# Patient Record
Sex: FEMALE | Race: WHITE | NOT HISPANIC OR LATINO | Employment: OTHER | URBAN - METROPOLITAN AREA
[De-identification: names, ages, dates, MRNs, and addresses within clinical notes are randomized per-mention and may not be internally consistent; named-entity substitution may affect disease eponyms.]

---

## 2019-06-05 ENCOUNTER — TRANSFERRED RECORDS (OUTPATIENT)
Dept: HEALTH INFORMATION MANAGEMENT | Facility: CLINIC | Age: 74
End: 2019-06-05

## 2021-09-16 ENCOUNTER — TRANSFERRED RECORDS (OUTPATIENT)
Dept: HEALTH INFORMATION MANAGEMENT | Facility: CLINIC | Age: 76
End: 2021-09-16

## 2021-11-15 ENCOUNTER — TRANSFERRED RECORDS (OUTPATIENT)
Dept: HEALTH INFORMATION MANAGEMENT | Facility: CLINIC | Age: 76
End: 2021-11-15

## 2021-12-10 ENCOUNTER — TRANSFERRED RECORDS (OUTPATIENT)
Dept: HEALTH INFORMATION MANAGEMENT | Facility: CLINIC | Age: 76
End: 2021-12-10

## 2022-01-10 ENCOUNTER — TRANSFERRED RECORDS (OUTPATIENT)
Dept: HEALTH INFORMATION MANAGEMENT | Facility: CLINIC | Age: 77
End: 2022-01-10
Payer: COMMERCIAL

## 2022-01-13 ENCOUNTER — TRANSCRIBE ORDERS (OUTPATIENT)
Dept: OTHER | Age: 77
End: 2022-01-13

## 2022-01-13 DIAGNOSIS — Z85.3 H/O MALIGNANT NEOPLASM OF BREAST: Primary | ICD-10-CM

## 2022-01-14 ENCOUNTER — PATIENT OUTREACH (OUTPATIENT)
Dept: ONCOLOGY | Facility: CLINIC | Age: 77
End: 2022-01-14
Payer: COMMERCIAL

## 2022-01-14 ENCOUNTER — DOCUMENTATION ONLY (OUTPATIENT)
Dept: ONCOLOGY | Facility: CLINIC | Age: 77
End: 2022-01-14

## 2022-01-14 NOTE — PROGRESS NOTES
New Patient Oncology Nurse Navigator Note     Referring provider: Dr. Jessica Jean MD      Referring Clinic/Organization: [facility]      Referred to (specialty): Medical Oncology     Requested provider (if applicable): Breast specialist at [facility]     Date Referral Received: January 14, 2022     Evaluation for:  Breast cancer     Clinical History (per Nurse review of records provided):      1984 - right breast cancer mastectomy and adjuvant chemo   Krystyna was diagnosed with right breast cancer in 1984 at age 39.   She was treated at [facility] in [place]. She has a right radical mastectomy on 9/17/84 showing IDC, ER/KY+, with mets to four of ten axillary lymph nodes.     Dr. Soren Milner was her medical oncologist and she received adjuvant chemotherapy.  The plan was for a six-month course of Cytoxan and Methotrexate for 8 cycles.      She does not recall taking any endocrine therapy.  No radiotherapy needed.     2012 -  She presented to the emergency room on 6/20/12 complaining of chest pain radiating to arm.  Acute coronary disease and pulmonary embolism were ruled out.  CT-Chest revealed multipl lung lesions.  PET on 6/26/2012 confirmed hypermetabolic lesion in rib.  CT-guided FNA of left rib on 7/3/12  was performed showing metastatic carcinoma (ER+, KY weak positive, HER2 negative), consistent with breast primary.     Please see Dr. Jean's note from 1/10/2022 for overview of treatment since 2012. Last note from [facility] provider, Dr. Jean, on 1/10/22.  Patient has been in his care since approximately 2013, and his colleague, Dr. Hector Díaz, prior to that.      She did have some care in [place] where she wintered.  This was at [facility] and she had injections monthly during her stays.      Genetic testing 6/5/2019 -- no clinically significant mutation identified.  [employer] BRACAnalysis was negative.      Records Location: Care Everywhere, Media and See  Bookmarked material     Records Needed:   Genetic counseling and testing from 2019.  Pathology review for specimens from 1984 and 2012 1984 breast cancer pathology report --[facility] in [place] per patient  1984 Surgery consult, op note, and progress notes  1984 Medical oncology consult note and progress notes  Imaging since 2012--Multiple PET scans sometimes quarterly images and reports needed please.    Records from [facility] at least since Jan 21, 2013    Telephoned and spoke with Krystyna to discuss her oncology history and needs at this time.  We will begin to assemble records and schedule her for second opinion with medical oncology breast cancer expert at .

## 2022-01-14 NOTE — PROGRESS NOTES
Action January 14, 2022 4:24 PM ABT   Action Taken Records from [facility] received and sent to HIM for upload

## 2022-01-17 ENCOUNTER — PRE VISIT (OUTPATIENT)
Dept: OTHER | Age: 77
End: 2022-01-17
Payer: COMMERCIAL

## 2022-01-17 NOTE — TELEPHONE ENCOUNTER
Action 1.17.22 8:40 AM LALIT   Action Taken Pulled in [place] in [place]. Faxed request to [place] [phone] and [facility] [phone] for all images     Faxed request [facility] for records [phone]     Action January 19, 2022 9:14 AM ABT   Action Taken Images from [facility] and [place] received and resolved to PACS. Fax sent to [facility] imaging team to resolve Mammos     Action January 26, 2022 9:03 AM ABT   Action Taken Records from [place] received and sent to HIM for upload

## 2022-01-20 NOTE — TELEPHONE ENCOUNTER
RECORDS STATUS - BREAST    RECORDS REQUESTED FROM: [facility] Vann   DATE REQUESTED:    NOTES DETAILS STATUS   OFFICE NOTE from referring provider Epic/NIVIA Vann/Edda  notes received - [facility]   OFFICE NOTE from medical oncologist NIVIA Wilde 1/10/22   Genetic Counseling NIVIA - Marilyn Peralta: 4/15/20   OFFICE NOTE from surgeon     OFFICE NOTE from radiation oncologist NIVIA Wilde 17   DISCHARGE SUMMARY from hospital     DISCHARGE REPORT from the ER     OPERATIVE REPORT     MEDICATION LIST     CLINICAL TRIAL TREATMENTS TO DATE     LABS     REQUEST BLOCKS FOR ALL BREAST CANCER PTS     PATHOLOGY REPORTS  (Tissue diagnosis, Stage, ER/VA percentage positive and intensity of staining, HER2 IHC, FISH, and all biopsies from breast and any distant metastasis)                 Requested     [facility]  7/3/12: [id number]      Path Report/ (Edwar) - Received  - [facility]   GENONOMIC TESTING     TYPE:   (Next Generation Sequencing, including Foundation One testing, and Oncotype score) Received 19: Indira  7/3/12: Liz Score   IMAGING (NEED IMAGES & REPORT)     CT SCANS     MRI PACS 19 - 12: [facility]   MAMMO     ULTRASOUND     PET PACS 19 - 12: [facility]   BONE SCAN     BRAIN MRI

## 2022-01-27 ENCOUNTER — LAB (OUTPATIENT)
Dept: LAB | Facility: CLINIC | Age: 77
End: 2022-01-27
Payer: MEDICARE

## 2022-01-27 DIAGNOSIS — C50.919 CARCINOMA OF BREAST METASTATIC TO BONE (H): Primary | ICD-10-CM

## 2022-01-27 DIAGNOSIS — C79.51 CARCINOMA OF BREAST METASTATIC TO BONE (H): Primary | ICD-10-CM

## 2022-02-01 ENCOUNTER — PATIENT OUTREACH (OUTPATIENT)
Dept: ONCOLOGY | Facility: CLINIC | Age: 77
End: 2022-02-01
Payer: COMMERCIAL

## 2022-02-01 ENCOUNTER — PRE VISIT (OUTPATIENT)
Dept: ONCOLOGY | Facility: CLINIC | Age: 77
End: 2022-02-01
Payer: COMMERCIAL

## 2022-02-01 ENCOUNTER — ONCOLOGY VISIT (OUTPATIENT)
Dept: ONCOLOGY | Facility: CLINIC | Age: 77
End: 2022-02-01
Payer: MEDICARE

## 2022-02-01 VITALS
HEART RATE: 92 BPM | BODY MASS INDEX: 31.84 KG/M2 | RESPIRATION RATE: 16 BRPM | SYSTOLIC BLOOD PRESSURE: 166 MMHG | OXYGEN SATURATION: 97 % | HEIGHT: 64 IN | WEIGHT: 186.5 LBS | DIASTOLIC BLOOD PRESSURE: 86 MMHG | TEMPERATURE: 98 F

## 2022-02-01 DIAGNOSIS — Z85.3 H/O MALIGNANT NEOPLASM OF BREAST: ICD-10-CM

## 2022-02-01 DIAGNOSIS — C50.911 BREAST CANCER METASTASIZED TO BONE, RIGHT (H): Primary | ICD-10-CM

## 2022-02-01 DIAGNOSIS — C79.51 BREAST CANCER METASTASIZED TO BONE, RIGHT (H): Primary | ICD-10-CM

## 2022-02-01 DIAGNOSIS — C79.51 BONE METASTASIS: ICD-10-CM

## 2022-02-01 PROCEDURE — G0463 HOSPITAL OUTPT CLINIC VISIT: HCPCS | Mod: 25

## 2022-02-01 PROCEDURE — 99205 OFFICE O/P NEW HI 60 MIN: CPT | Performed by: INTERNAL MEDICINE

## 2022-02-01 PROCEDURE — 99417 PROLNG OP E/M EACH 15 MIN: CPT | Performed by: INTERNAL MEDICINE

## 2022-02-01 PROCEDURE — G0463 HOSPITAL OUTPT CLINIC VISIT: HCPCS

## 2022-02-01 RX ORDER — CRANBERRY FRUIT EXTRACT 200 MG
1 CAPSULE ORAL
COMMUNITY

## 2022-02-01 RX ORDER — ACETAMINOPHEN 325 MG/1
650 TABLET ORAL
COMMUNITY

## 2022-02-01 RX ORDER — VIT A/VIT C/VIT E/ZINC/COPPER 4296-226
1 CAPSULE ORAL
COMMUNITY

## 2022-02-01 RX ORDER — ANASTROZOLE 1 MG/1
TABLET ORAL
COMMUNITY
Start: 2021-10-16

## 2022-02-01 RX ORDER — CHLORAL HYDRATE 500 MG
1000 CAPSULE ORAL
COMMUNITY

## 2022-02-01 RX ORDER — ASCORBIC ACID 500 MG
500 TABLET ORAL
COMMUNITY

## 2022-02-01 RX ORDER — SODIUM PHOSPHATE,MONO-DIBASIC 19G-7G/118
1 ENEMA (ML) RECTAL
COMMUNITY

## 2022-02-01 ASSESSMENT — MIFFLIN-ST. JEOR: SCORE: 1313.71

## 2022-02-01 ASSESSMENT — PAIN SCALES - GENERAL: PAINLEVEL: NO PAIN (0)

## 2022-02-01 NOTE — PROGRESS NOTES
Met with patient and her daughter to introduce self and provide contact information.   Sent email to daughters email to set up [facility].   Plan is for Dr Ferrera to find an Oncologist to recommend locally.   Dr Ferrera discussing options on radiation treatments.   Will follow-up with patient when information is available.   Encouraged her to call with any additional questions.

## 2022-02-01 NOTE — PROGRESS NOTES
Oncology Consultation:  Date on this visit: 2/1/2022    Krystyna Aldridge is referred by Dr. Jean from [facility] in [place], [place] for an oncology consultation. She requires evaluation for ER positive breast cancer metastasized to bone.    Primary Physician: Anibal Stock     History Of Present Illness:  Ms. Aldridge is a 76 year old female with recurrent right breast cancer metastasized to bone.  She was diagnosed with a right breast cancer in 1984.  This was treated with right breast mastectomy followed by adjuvant chemotherapy.  She recurred in 2012.      She presented in 07/2012 with chest pain radiating to the arm.  CT chest showed a left lateral 7th rib lesion, multiple small pulmonary nodules and a small focal area of pleural thickening at the left lung base.  PET/CT confirmed a hypermetabolic lesion in the rib and additional areas in the right axilla, right anterior chest wall, and left pedicle of T12.  Biopsy of the left rib was c/w recurrent metastatic breast cancer, ER positive (Liz 8/8), MI weak positive (Liz 3/8), and HER2 negative (1+ by IHC).  She was initiated on treatment with fulvestrant and Xgeva.  She remained on this treatment until 01/2017 at which time imaging showed progression of bone metastases (at T9, T10, and L1).  She was then on treatment with fulvestrant and anastrozole.  Ribociclib was added 05/2018 due to progression of bone metastases.  07/2018 ribociclib was changed to palbociclib due to increasing LFTs.  She continues on treatment with palbociclib and anastrozole at this time.  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  Xgeva was subsequently discontinued.  PET/CT in 12/2021 and MRI of the C-spine in 01/2022 with a new C6 metastasis.      Metastatic breast cancer treatment:  1.  2012 - 01/2017  Fulvestrant and Xgeva  2.  01/2017 - 05/2018  Fulvestrant, anastrozole, and Xgeva  3.  05/2018 - 07/2018  Ribociclib,  fulvestrant, anastrozole, and Xgeva  4.  07/2018 - present  Palbociclib, fulvestrant, and anastrozole.  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    Patient reports she is generally in good health.  She confirms she continues on treatment with Ibrance and fulvestrant.  She is tolerating treatment well.  She denies current bone pains.  She specifically denies pain in the area of the neck.  She has no numbness, tingling, or pain down the arms.  She reports numbness involving all of the fingertips of both hands, which she states has been present ever since starting fulvestrant.  She has had no fevers, chills, or infectious complaints.  She denies cough, shortness of breath or chest pain.  She has no abdominal complaints.  She states she was concerned when she saw a diagnosis of cancer of the lip on her recent C-spine MRI, as she has never had such a cancer.  The remainder of a complete 12 point review of systems was reviewed with the patient and was negative with the exception of that mentioned above.    Past Medical/Surgical History:  Right hip AFSHAN  Left hip replacement  Breast cancer as per HPI  CKD.  Genetic testing 6/5/2019 without pathologic mutation.    Allergies:  Allergies as of 02/01/2022     (Not on File)     Current Medications:  Current Outpatient Medications   Medication     acetaminophen (TYLENOL) 325 MG tablet     anastrozole (ARIMIDEX) 1 MG tablet     Calcium-Vitamin D-Vitamin K 500-1000-40 MG-UNT-MCG CHEW     esomeprazole (NEXIUM) 20 MG DR capsule     fish oil-omega-3 fatty acids 1000 MG capsule     fulvestrant (FASLODEX) 250 MG/5ML injection     Ginkgo Biloba 100 MG CAPS     glucosamine-chondroitin 500-400 MG CAPS per capsule     Multiple Vitamins-Minerals (PRESERVISION AREDS) CAPS     palbociclib (IBRANCE) 75 MG tablet     Red Yeast Rice Extract 600 MG CAPS     vitamin C (ASCORBIC ACID) 500 MG tablet     No current facility-administered medications for this visit.     "  Family History:  Sister  from complications of diabetes.  Mother with breast cancer at 51 yo.  Sister with breast cancer at 66 yo.  Sister with uterine cancer at 62 yo.    Social History:  ,   of a stroke around [place] of 2016.  Patient resides in [place], [place].  Her daughter, Quintin, also lives in [place].    Physical Exam:  BP (!) 166/86   Pulse 92   Temp 98  F (36.7  C)   Resp 16   Ht 1.63 m (5' 4.17\")   Wt 84.6 kg (186 lb 8 oz)   SpO2 97%   BMI 31.84 kg/m    General:  Well appearing adult female in NAD.  Appears younger than stated age.  Alert and oriented.  HEENT:  Normocephalic.  Sclera anicteric.  MMM.  No lesions of the oropharynx - I am specifically unable to see any exposed bone, posterior right mandible in the area of known osteonecrosis of the jaw.  Lymph:  No palpable cervical, supraclavicular, or axillary LAD.  Chest:  CTA bilaterally.  No wheezes or crackles.  CV:  RRR.  Nl S1 and S2.  Abd:  Soft/ND.  BSs normoactive.  No hepatosplenomegaly.  Ext:  No pitting edema of the bilateral lower extremities.    Musculo:  Full ROM of the bilateral upper extremities.  Neuro:  Cranial nerves grossly intact.  Psych:  Mood and affect appear normal.  Skin:  No visible concerning skin rashes or lesions.    Laboratory/Imaging Studies  12/10/2021 PET/CT:  Multifocal hypermetabolic osseous metastatic disease, stable to slightly decreased from prior.  Hypermetabolic activity C6 vertebra, slightly increased from previous exam.  Right mandible lesion        2022 MRI cervical spine:  - marrow replacing process with enhancement involving the C6 vertebral body without significant loss of vertebral body height.  Additional focus of enhancing osseous metastatic disease left C6 inferior articular facet.  - indeterminate marrow replacing processes with the left C5 facet and the right T1 facet.        * I reviewed imaging with neuroradiologist and body CT fellow.  The uptake " appears anterior to C6 on PET/CT.  This is likely due to motion artifact.  Review of cervical spine MRI confirms C6 metastasis and no soft tissue abnormality anterior to this.    CA27.29  1/7 (67.8), 12/10 (63.8), 11/10 (61.4), 10/14 (67)    ASSESSMENT/PLAN:  75 yo female with ER positive breast cancer metastasized to bones.  She is currently on treatment with Ibrance and Faslodex.    1.  Metastatic breast cancer:  She presents today alongside her daughter, Quintin, for a second opinion in the setting of recent imaging showing a new C6 vertebral metastasis.  We reviewed her diagnosis, staging, prognosis, and treatment options.  We reviewed her history including initial breast cancer treated with right mastectomy and adjuvant chemotherapy in 1984.  Per patient, she did not receive any endocrine therapy at that time.  She recurred in the summer of 2012 in the bones.  She was initially treated with fulvestrant and anastrozole, later a CDK 4/6 inhibitor was added.  She continues on Ibrance, anastrozole, and fulvestrant at this time.      We reviewed the goal of treatment of stage IV breast cancer is palliative, or in other words, to prolong overall survival while maintaining quality of life by reducing symptoms from the tumor.  Average prognosis for stage IV ER positive breast cancer is around 5 years.  Her disease has been relatively indolent and she has done much better than this.  It is standard in the treatment of metastatic ER positive breast cancer to initiate treatment with endocrine therapy due to durable periods of response and limited side effects.  Treatments are continued until either disease progression, or intolerable side effect at which time another treatment is tried.      We then reviewed her recent PET/CT and MRI cervical spine images together which are consistent with a new metastasis at C6.  Prior to our visit, I reviewed these with Neuroradiology and body CT radiology, who are in agreement with this.   She is asymptomatic of disease at this site.  I expressed concern regarding the extent of involvement of the vertebral body and concern for future risk of cord compression and sequelae of cord compression at this level.  I am recommending aggressive local treatment at this level.  We discussed SBRT vs RFA + vertebroplasty as options.  We briefly discussed the pros and cons of each.  Following our visit, I discussed her case with Dr. Arias of radiation oncology and Dr. Overton of our interventional pain team, who both agree that SBRT is likely to offer the best local control of disease in this area.  She inquired as to whether she could do this in [place].  I recommend that she receive the treatment here.  She was in agreement with this.    We then discussed her systemic therapy.  Given history of relatively indolent disease and a single site of metastasis, I recommend continuing treatment with Ibrance and fulvestrant at this time.  As approximately 30% of patients progressed on palbociclib respond to abemaciclib, a change to abemaciclib and fulvestrant could be considered, however, abemaciclib has increased rates of elevation of LFTs and therefore I suspect she may not tolerate it.  I recommend repeating a PET/CT in approximately 3 months.    She mentions that she was without disease for 30 years after chemotherapy and wonders whether chemotherapy at this time, may be helpful.  We reviewed the nature and natural course of metastatic breast cancer.  We discussed average duration of action of IV chemotherapy in the metastatic setting is typically around 6 months and chemotherapy tends to have increased side effects compared to endocrine or targeted based treatments.  We discussed breaks from treatment often lead to early resistance and therefore are generally avoided.    I recommend biopsy with NGS (Kristal or [employer] One) of her cancer to determine appropriate next line treatment.  Ideally, given disease  progression, would perform this now.  However, the location of her disease progression in the vertebral body of the C6 vertebrae is in a site that is not easily amenable to biopsy.  I would therefore recommend performing this at the time of next disease progression.  This will give insight into whether her breast cancer has specific mutations amenable to targeted therapies such as PIK3CA/alpelisib, HER2 mutation/Neratinib, etc.  We discussed NGS of circulating tumor DNA is also possible ([employer]ant 360), however, the frequency of mutations is often low and therefore it is more difficult to determine the significance.  Therefore, I prefer NGS be performed off of a tissue biopsy.    Plan at this time:  - radiation referral for SBRT to C6 vertebrae  - continue treatment with palbociclib and fulvestrant  - repeat imaging in approximately 3 months  - tissue biopsy with NGS at the time of next disease progression  - patient plans to resume medical oncology care in [place].  She will contact our clinic with any questions or concerns.    90 minutes spent on the date of the encounter doing chart review, review of outside records, review of test results, interpretation of tests, patient visit, documentation, discussion with other provider(s) and discussion with family.    Majo Ferrera MD

## 2022-02-01 NOTE — NURSING NOTE
"Oncology Rooming Note    February 1, 2022 12:15 PM   Krystyna Aldridge is a 76 year old female who presents for:    Chief Complaint   Patient presents with     Oncology Clinic Visit     [facility] NEW - BREAST CANCER     Initial Vitals: BP (!) 166/86   Pulse 92   Temp 98  F (36.7  C)   Resp 16   Ht 1.63 m (5' 4.17\")   Wt 84.6 kg (186 lb 8 oz)   SpO2 97%   BMI 31.84 kg/m   Estimated body mass index is 31.84 kg/m  as calculated from the following:    Height as of this encounter: 1.63 m (5' 4.17\").    Weight as of this encounter: 84.6 kg (186 lb 8 oz). Body surface area is 1.96 meters squared.  No Pain (0) Comment: Data Unavailable   No LMP recorded. Patient is postmenopausal.  Allergies reviewed: Yes  Medications reviewed: Yes    Medications: Medication refills not needed today.  Pharmacy name entered into EPIC: [id number] - [address]    Clinical concerns: No new concerns. Iban was notified.      Lloyd Rutledge, KIERRA            "

## 2022-02-02 ENCOUNTER — PATIENT OUTREACH (OUTPATIENT)
Dept: ONCOLOGY | Facility: CLINIC | Age: 77
End: 2022-02-02
Payer: COMMERCIAL

## 2022-02-03 NOTE — PROGRESS NOTES
Department of Radiation Oncology                   [place] [address]  [address] [place]  [address]  Office:  [phone]  Fax:  [phone]   Radiation Oncology Clinic  [address] [place] [address]  Phone:  [phone]  Fax:  [phone]     RE: Krystyna Aldridge : 1945   MRN: [id number] ISIDRO: 2022       OUTPATIENT VISIT NOTE       PROBLEM: Metastatic breast cancer involving C6 vertebral body     HISTORY OF PRESENT ILLNESS: Ms. Aldridge is a 76 year old woman from [place], [place] with a new C6 vertebral body metastasis secondary to breast cancer.     She was initially diagnosed with a right sided ER+ breast cancer in  which was treated at that time with a right mastectomy and adjuvant chemotherapy. She did not receive adjuvant hormonal therapy. She did well until 2012 when she developed chest pain radiating to the right arm. Work up noted multiple small pulmonary nodules, a pleural thickening of the left lung base, a lesion in the left lateral 7th rib and left T12 pedicle,  as well as additional disease in the right axilla, right anterior chest wall.  Biopsy of the rib demonstrated recurrent breast cancer, ER+/MI weakly positive/HER2-. She was started on Fulvestrant / Xgeva on 12, and imaging demonstrated response to this therapy.     PET/CT on 2017 demonstrated several new areas of bony involvement which was confirmed by MRI. She was asymptomatic at this time. Anastrozole was added on 3/1/2017. Ribociclib was added in 2018 due to progression in bone metastases, but changed to Palbociclib due to elevated LFTs.     PET/CT on 21 demonstrated uptake in the right mandible, confirmed on X-Ray. 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 PET/CT on 12/10/21 again showed multifocal hypermetabolic osseus disease, mostly stable to slightly decreased, except for C6 vertebra, which showed slightly increased uptake with  max SUV from 3.3 to 3.6. She was further evaluated with a cervical spine MRI on 1/7/22 which demonstrated no compression fracture, but confirmed a marrow replacing process with enhancement involving the C6 vertebral body along with an additional focus of osseous metastatic disease in the left C6 inferior articular facet. 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    She recently had a second opinion with Dr. Ferrera at [facility] who recommended treatment of the oligoprogressive C6 lesion, and continuation with Palbocicilib, Fulvestrant and Anastrozole.     Krystyna is here today with her daughter. On interview today, she states that is feeling well overall. She has no pain associated with her bony metastases. She specifically denies pain in her low neck from C6.     PMH:     Chronic kidney disease    Primary osteoarthritis of the left hip    PSH:  No past surgical history on file.    MEDICATIONS:    Acetaminophen    Anastrozole    Calcium-vitamin D    East omeprazole    Fish oil-omega-3 fatty acids    Fulvestrant    Ginkgo biloba    Glucosamine-chondroitin    Multivitamin    Palbociclib    Red yeast rice extract    Vitamin C    ALLERGY:  No Known Allergies    FAMILY HISTORY:    Mother with breast cancer at the age of 50    Sister with breast cancer at the age of 65    Sister with uterine cancer at the age of 63      SOCIAL HISTORY  - Resides in [place], [place]  - , lost her  on Christmas 2016 after a massive stroke  - 3 children, 2 who live locally and 1 in [place], [place]; 6 grandchildren  - Never smoker  - No alcohol use      ECOG PERFORMANCE STATUS: 0    HISTORY OF RADIATION: RT to   IMPLANTED CARDIAC DEVICE: None  PREGNANCY RISK: Post-menopausal    REVIEW OF SYMPTOMS:  A full 10-point review of systems was performed as per nursing note.  Pertinent negatives and positives reviewed.    PHYSICAL EXAMINATION:    Gen: Alert, in  NAD  Eyes: EOMI, sclera anicteric  HENT      Head: NC/AT     Ears: No external auricular lesions     Nose/sinus: No rhinorrhea or epistaxis     Oral Cavity/Oropharynx: MMM, no thrush noted  Pulm: Breathing comfortably on room air, no audible wheezes or ronchi  CV: Well-perfused, no cyanosis  Abdominal: Soft, nondistended  Skin: Normal color and turgor  Neurologic/MSK: motor grossly intact, normal gait  Psych: Appropriate mood and affect    RADIOLOGY/IMAGING:      ASSESSMENT AND PLAN: In summary, Ms. Aldridge is a 75 yo female with a breast cancer initially diagnosed in 1984, who then developed primarily bone only metastases, on systemic therapy. Her most recent PET/CT scan showed stable to slightly decreased uptake in most of her bony disease, but slight increase in C6. MRI of the spine confirms viable disease in this location. She is asymptomatic from this lesion.    We discussed that it is reasonable to consider radiation therapy to C6, as this seems to be the only metastasis progressing on her current regimen. Treating this lesion with radiation therapy will allow her to stay on her systemic regimen, which is tolerating well. Additionally, treatment could potentially prevent pathologic fracture and cord compression.    She is a candidate for stereotactic body radiation therapy. I described the rationale, immobilization, treatment delivery and side effects in great detail. She may be treated with 9 Gy x 3 fractions or 6 Gy x 5 fractions, depending on the cord dose.     Ms. Aldridge would like to proceed. She is being simulated today. We have also coordinated a treatment planning MRI to facilitate target delineation.     Thank you for allowing us to participate in this patient's care.  Please feel free to call with any questions or concerns.                  I reviewed patient's chart, internal/external medical records, imaging studies (including actual images), labs.  I interviewed and counseled the patient face to  face.  I additionally ordered tests (MRI of the spine) and discussed the case with patient's referring physicians and care team (Dr. Ferrera).      80 minutes were spent on the date of the encounter doing chart review, history and exam, documentation and further activities as noted above.           Cezar Arias MD

## 2022-02-03 NOTE — PROGRESS NOTES
RN CARE COORDINATION NOTE      Called Krystyna to discuss Dr Dodd recommendation for SBRT. Discussed that she would need to come to the [facility] for a consultation and simulation and then return for treatment that would take 3-5 days.   Also discussed that due to the location in the vertebral body of C6, Dr. Ferrera is not recommending biopsy prior to the radiation.      Krystyna would like to move forward with the treatment. She has a sister who lives in the [facility] that she can stay with during treatment. She understands that she will receive a call from Dr. Arias's office to make the appointments.     Encouraged her to call with any additional questions or concerns.       Delisa Chacko MSN, RN, OCN  RN Care Coordinator  [facility]  [phone]

## 2022-02-09 ENCOUNTER — OFFICE VISIT (OUTPATIENT)
Dept: RADIATION ONCOLOGY | Facility: CLINIC | Age: 77
End: 2022-02-09
Attending: RADIOLOGY
Payer: MEDICARE

## 2022-02-09 ENCOUNTER — HOSPITAL ENCOUNTER (OUTPATIENT)
Dept: MRI IMAGING | Facility: CLINIC | Age: 77
Discharge: HOME | End: 2022-02-09
Attending: RADIOLOGY | Admitting: RADIOLOGY
Payer: MEDICARE

## 2022-02-09 VITALS
HEART RATE: 84 BPM | SYSTOLIC BLOOD PRESSURE: 173 MMHG | DIASTOLIC BLOOD PRESSURE: 99 MMHG | BODY MASS INDEX: 32.11 KG/M2 | WEIGHT: 188.1 LBS

## 2022-02-09 DIAGNOSIS — C79.51 BONE METASTASIS: Primary | ICD-10-CM

## 2022-02-09 DIAGNOSIS — C79.51 BONE METASTASIS: ICD-10-CM

## 2022-02-09 PROCEDURE — 77290 THER RAD SIMULAJ FIELD CPLX: CPT | Mod: 26 | Performed by: RADIOLOGY

## 2022-02-09 PROCEDURE — 72156 MRI NECK SPINE W/O & W/DYE: CPT

## 2022-02-09 PROCEDURE — A9585 GADOBUTROL INJECTION: HCPCS | Performed by: RADIOLOGY

## 2022-02-09 PROCEDURE — 77470 SPECIAL RADIATION TREATMENT: CPT | Mod: 26 | Performed by: RADIOLOGY

## 2022-02-09 PROCEDURE — 255N000002 HC RX 255 OP 636: Performed by: RADIOLOGY

## 2022-02-09 PROCEDURE — 99205 OFFICE O/P NEW HI 60 MIN: CPT | Mod: 25 | Performed by: RADIOLOGY

## 2022-02-09 PROCEDURE — 77290 THER RAD SIMULAJ FIELD CPLX: CPT | Performed by: RADIOLOGY

## 2022-02-09 PROCEDURE — 77334 RADIATION TREATMENT AID(S): CPT | Mod: 26 | Performed by: RADIOLOGY

## 2022-02-09 PROCEDURE — 72156 MRI NECK SPINE W/O & W/DYE: CPT | Mod: 26 | Performed by: RADIOLOGY

## 2022-02-09 PROCEDURE — G1004 CDSM NDSC: HCPCS | Performed by: RADIOLOGY

## 2022-02-09 PROCEDURE — 77334 RADIATION TREATMENT AID(S): CPT | Performed by: RADIOLOGY

## 2022-02-09 PROCEDURE — 77470 SPECIAL RADIATION TREATMENT: CPT | Performed by: RADIOLOGY

## 2022-02-09 PROCEDURE — G0463 HOSPITAL OUTPT CLINIC VISIT: HCPCS | Mod: 25 | Performed by: RADIOLOGY

## 2022-02-09 RX ORDER — GADOBUTROL 604.72 MG/ML
10 INJECTION INTRAVENOUS ONCE
Status: COMPLETED | OUTPATIENT
Start: 2022-02-09 | End: 2022-02-09

## 2022-02-09 RX ADMIN — GADOBUTROL 8.5 ML: 604.72 INJECTION INTRAVENOUS at 12:46

## 2022-02-09 ASSESSMENT — ENCOUNTER SYMPTOMS
HEADACHES: 0
VOMITING: 0
CHILLS: 0
WHEEZING: 0
BACK PAIN: 0
NAUSEA: 0
DIARRHEA: 0
DIZZINESS: 0
FEVER: 0
NECK PAIN: 0
DEPRESSION: 0
CONSTIPATION: 0
SORE THROAT: 0
FREQUENCY: 0
SHORTNESS OF BREATH: 0
WEIGHT LOSS: 0
FALLS: 0
COUGH: 0
NERVOUS/ANXIOUS: 0
HEARTBURN: 1

## 2022-02-09 NOTE — LETTER
2022         RE: Krystyna Aldridge  [address]  [facility] [address]        Dear Colleague,    Thank you for referring your patient, Krystyna Aldridge, to the [facility] RADIATION ONCOLOGY. Please see a copy of my visit note below.    Department of Radiation Oncology                   [place] [address]  [address] [place]  [address]  Office:  [phone]  Fax:  [phone]   Radiation Oncology Clinic  [address] [place] [address]  Phone:  [phone]  Fax:  [phone]     RE: Krystyna lAdridge : 1945   MRN: [id number] ISIDRO: 2022       OUTPATIENT VISIT NOTE       PROBLEM: Metastatic breast cancer involving C6 vertebral body     HISTORY OF PRESENT ILLNESS: Ms. Aldridge is a 76 year old woman from [place], [place] with a new C6 vertebral body metastasis secondary to breast cancer.     She was initially diagnosed with a right sided ER+ breast cancer in  which was treated at that time with a right mastectomy and adjuvant chemotherapy. She did not receive adjuvant hormonal therapy. She did well until 2012 when she developed chest pain radiating to the right arm. Work up noted multiple small pulmonary nodules, a pleural thickening of the left lung base, a lesion in the left lateral 7th rib and left T12 pedicle,  as well as additional disease in the right axilla, right anterior chest wall.  Biopsy of the rib demonstrated recurrent breast cancer, ER+/NC weakly positive/HER2-. She was started on Fulvestrant / Xgeva on 12, and imaging demonstrated response to this therapy.     PET/CT on 2017 demonstrated several new areas of bony involvement which was confirmed by MRI. She was asymptomatic at this time. Anastrozole was added on 3/1/2017. Ribociclib was added in 2018 due to progression in bone metastases, but changed to Palbociclib due to elevated LFTs.     PET/CT on 21 demonstrated uptake in the right mandible, confirmed on X-Ray. 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 PET/CT on 12/10/21 again showed multifocal hypermetabolic osseus disease, mostly stable to slightly decreased, except for C6 vertebra, which showed slightly increased uptake with max SUV from 3.3 to 3.6. She was further evaluated with a cervical spine MRI on 1/7/22 which demonstrated no compression fracture, but confirmed a marrow replacing process with enhancement involving the C6 vertebral body along with an additional focus of osseous metastatic disease in the left C6 inferior articular facet. 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    She recently had a second opinion with Dr. Ferrera at [facility] who recommended treatment of the oligoprogressive C6 lesion, and continuation with Palbocicilib, Fulvestrant and Anastrozole.     Krystyna is here today with her daughter. On interview today, she states that is feeling well overall. She has no pain associated with her bony metastases. She specifically denies pain in her low neck from C6.     PMH:     Chronic kidney disease    Primary osteoarthritis of the left hip    PSH:  No past surgical history on file.    MEDICATIONS:    Acetaminophen    Anastrozole    Calcium-vitamin D    East omeprazole    Fish oil-omega-3 fatty acids    Fulvestrant    Ginkgo biloba    Glucosamine-chondroitin    Multivitamin    Palbociclib    Red yeast rice extract    Vitamin C    ALLERGY:  No Known Allergies    FAMILY HISTORY:    Mother with breast cancer at the age of 50    Sister with breast cancer at the age of 65    Sister with uterine cancer at the age of 63      SOCIAL HISTORY  - Resides in [place], [place]  - , lost her  on Christmas 2016 after a massive stroke  - 3 children, 2 who live locally and 1 in [place], [place]; 6 grandchildren  - Never smoker  - No alcohol use      ECOG PERFORMANCE STATUS: 0    HISTORY OF RADIATION: RT  to   IMPLANTED CARDIAC DEVICE: None  PREGNANCY RISK: Post-menopausal    REVIEW OF SYMPTOMS:  A full 10-point review of systems was performed as per nursing note.  Pertinent negatives and positives reviewed.    PHYSICAL EXAMINATION:    Gen: Alert, in NAD  Eyes: EOMI, sclera anicteric  HENT      Head: NC/AT     Ears: No external auricular lesions     Nose/sinus: No rhinorrhea or epistaxis     Oral Cavity/Oropharynx: MMM, no thrush noted  Pulm: Breathing comfortably on room air, no audible wheezes or ronchi  CV: Well-perfused, no cyanosis  Abdominal: Soft, nondistended  Skin: Normal color and turgor  Neurologic/MSK: motor grossly intact, normal gait  Psych: Appropriate mood and affect    RADIOLOGY/IMAGING:      ASSESSMENT AND PLAN: In summary, Ms. Aldridge is a 77 yo female with a breast cancer initially diagnosed in 1984, who then developed primarily bone only metastases, on systemic therapy. Her most recent PET/CT scan showed stable to slightly decreased uptake in most of her bony disease, but slight increase in C6. MRI of the spine confirms viable disease in this location. She is asymptomatic from this lesion.    We discussed that it is reasonable to consider radiation therapy to C6, as this seems to be the only metastasis progressing on her current regimen. Treating this lesion with radiation therapy will allow her to stay on her systemic regimen, which is tolerating well. Additionally, treatment could potentially prevent pathologic fracture and cord compression.    She is a candidate for stereotactic body radiation therapy. I described the rationale, immobilization, treatment delivery and side effects in great detail. She may be treated with 9 Gy x 3 fractions or 6 Gy x 5 fractions, depending on the cord dose.     Ms. Aldridge would like to proceed. She is being simulated today. We have also coordinated a treatment planning MRI to facilitate target delineation.     Thank you for allowing us to participate in this  patient's care.  Please feel free to call with any questions or concerns.                  I reviewed patient's chart, internal/external medical records, imaging studies (including actual images), labs.  I interviewed and counseled the patient face to face.  I additionally ordered tests (MRI of the spine) and discussed the case with patient's referring physicians and care team (Dr. Ferrera).      80 minutes were spent on the date of the encounter doing chart review, history and exam, documentation and further activities as noted above.           Cezar Arias MD        HPI    INITIAL PATIENT ASSESSMENT    Diagnosis: met breast cancer    Prior radiation therapy: None    Prior chemotherapy:   Protocol: faslodex and ibrance  Facility:     Dates:monthly      Prior hormonal therapy:    Pain Eval:  Denies    Psychosocial  Living arrangements: self  Fall Risk: independent   referral needs: Not needed    Advanced Directive: No  Implantable Cardiac Device? No    Onset of menarche: 10  LMP: No LMP recorded. Patient is postmenopausal.  Onset of menopause: 39  Abnormal vaginal bleeding/discharge: No  Are you pregnant? No  Reproductive note: grown children    Nurse face-to-face time: Level 4:  15 min face to face time  Review of Systems   Constitutional: Negative for chills, fever, malaise/fatigue and weight loss.   HENT: Negative for congestion, hearing loss, sore throat and tinnitus.    Respiratory: Negative for cough, shortness of breath and wheezing.    Cardiovascular: Negative for chest pain and leg swelling.   Gastrointestinal: Positive for heartburn. Negative for constipation, diarrhea, nausea and vomiting.   Genitourinary: Negative for frequency and urgency.   Musculoskeletal: Negative for back pain, falls and neck pain.   Skin: Negative for itching and rash.   Neurological: Negative for dizziness and headaches.   Psychiatric/Behavioral: Negative for depression. The patient is not nervous/anxious.       Again, thank you for allowing me to participate in the care of your patient.        Sincerely,        Cezar Arias MD

## 2022-02-09 NOTE — PROGRESS NOTES
HPI    INITIAL PATIENT ASSESSMENT    Diagnosis: met breast cancer    Prior radiation therapy: None    Prior chemotherapy:   Protocol: faslodex and ibrance  Facility:     Dates:monthly      Prior hormonal therapy:    Pain Eval:  Denies    Psychosocial  Living arrangements: self  Fall Risk: independent   referral needs: Not needed    Advanced Directive: No  Implantable Cardiac Device? No    Onset of menarche: 10  LMP: No LMP recorded. Patient is postmenopausal.  Onset of menopause: 39  Abnormal vaginal bleeding/discharge: No  Are you pregnant? No  Reproductive note: grown children    Nurse face-to-face time: Level 4:  15 min face to face time  Review of Systems   Constitutional: Negative for chills, fever, malaise/fatigue and weight loss.   HENT: Negative for congestion, hearing loss, sore throat and tinnitus.    Respiratory: Negative for cough, shortness of breath and wheezing.    Cardiovascular: Negative for chest pain and leg swelling.   Gastrointestinal: Positive for heartburn. Negative for constipation, diarrhea, nausea and vomiting.   Genitourinary: Negative for frequency and urgency.   Musculoskeletal: Negative for back pain, falls and neck pain.   Skin: Negative for itching and rash.   Neurological: Negative for dizziness and headaches.   Psychiatric/Behavioral: Negative for depression. The patient is not nervous/anxious.

## 2022-02-14 LAB
PATH REPORT.COMMENTS IMP SPEC: NORMAL
PATH REPORT.FINAL DX SPEC: NORMAL
PATH REPORT.GROSS SPEC: NORMAL
PATH REPORT.MICROSCOPIC SPEC OTHER STN: NORMAL
PATH REPORT.RELEVANT HX SPEC: NORMAL
PATH REPORT.RELEVANT HX SPEC: NORMAL
PATH REPORT.SITE OF ORIGIN SPEC: NORMAL

## 2022-02-14 PROCEDURE — 88321 CONSLTJ&REPRT SLD PREP ELSWR: CPT | Performed by: PATHOLOGY

## 2022-02-17 ENCOUNTER — APPOINTMENT (OUTPATIENT)
Dept: RADIATION ONCOLOGY | Facility: CLINIC | Age: 77
End: 2022-02-17
Attending: RADIOLOGY
Payer: MEDICARE

## 2022-02-17 ENCOUNTER — ONCOLOGY VISIT (OUTPATIENT)
Dept: RADIATION ONCOLOGY | Facility: CLINIC | Age: 77
End: 2022-02-17
Payer: COMMERCIAL

## 2022-02-17 PROCEDURE — 77373 STRTCTC BDY RAD THER TX DLVR: CPT | Performed by: RADIOLOGY

## 2022-02-18 ENCOUNTER — ONCOLOGY VISIT (OUTPATIENT)
Dept: RADIATION ONCOLOGY | Facility: CLINIC | Age: 77
End: 2022-02-18
Payer: COMMERCIAL

## 2022-02-18 ENCOUNTER — APPOINTMENT (OUTPATIENT)
Dept: RADIATION ONCOLOGY | Facility: CLINIC | Age: 77
End: 2022-02-18
Attending: RADIOLOGY
Payer: MEDICARE

## 2022-02-18 PROCEDURE — 77373 STRTCTC BDY RAD THER TX DLVR: CPT | Performed by: RADIOLOGY

## 2022-02-18 NOTE — PROCEDURES
DEANNA BARNES        MR#:  [id number]                 Diagnosis:    C79.51   Secondary malignant neoplasm of bone     Medical Indication: To escalate the radiotherapy dose for better local control and pain relief   using stereotactic body radiotherapy technique and to spare spinal cord and normal tissues from   excessive toxicity.        Spine SBRT Special Treatment Procedure Note  Date of Service: 2/17/2022       SBRT planning was completed today to prepare for spine treatment. SBRT planning is chosen to   avoid the critical structures, which can not be done adequately with conventional planning due to   the dose constraints of the normal surrounding critical organs, especially spinal cord. To achieve   better local control and better pain relief, the treatment will be high dose per fraction with   complete course to be done in 5 sessions.     The patient previously had CT scan acquisition for planning and special treatment aids used   included.  The patient was simulated in a supine position.  MRI using special SBRT protocol was   also performed to accurately delineate the spinal cord and gross tumor volume.      After the contouring of the GTV and a clinical tumor volume (CTV), expanded volume of   planned treatment volume (PTV) was carried out on the treatment planning system. Critical   structures outlined included both right and left lung, spinal cord, heart and esophagus.      Extra efforts were required to immobilize the patient using the custom designed stereotactic   frame as well as planning. Extra efforts during the planning process included contouring of   critical strictures, GTV, ITV, CTV, PTV, and evaluating the DVH and coverage of the PTV.        Cezar Arias M.D.

## 2022-02-19 NOTE — PROCEDURES
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        Spine SBRT Special Treatment Procedure Note  Date of Service: 2/18/2022       SBRT planning was completed today to prepare for spine treatment. SBRT planning is chosen to   avoid the critical structures, which can not be done adequately with conventional planning due to   the dose constraints of the normal surrounding critical organs, especially spinal cord. To achieve   better local control and better pain relief, the treatment will be high dose per fraction with   complete course to be done in 5 sessions.     The patient previously had CT scan acquisition for planning and special treatment aids used   included.  The patient was simulated in a supine position.  MRI using special SBRT protocol was   also performed to accurately delineate the spinal cord and gross tumor volume.      After the contouring of the GTV and a clinical tumor volume (CTV), expanded volume of   planned treatment volume (PTV) was carried out on the treatment planning system. Critical   structures outlined included both right and left lung, spinal cord, heart and esophagus.      Extra efforts were required to immobilize the patient using the custom designed stereotactic   frame as well as planning. Extra efforts during the planning process included contouring of   critical strictures, GTV, ITV, CTV, PTV, and evaluating the DVH and coverage of the PTV.        Ella Mercado MD

## 2022-02-21 ENCOUNTER — APPOINTMENT (OUTPATIENT)
Dept: RADIATION ONCOLOGY | Facility: CLINIC | Age: 77
End: 2022-02-21
Attending: RADIOLOGY
Payer: MEDICARE

## 2022-02-21 VITALS
RESPIRATION RATE: 16 BRPM | SYSTOLIC BLOOD PRESSURE: 150 MMHG | BODY MASS INDEX: 32.06 KG/M2 | DIASTOLIC BLOOD PRESSURE: 82 MMHG | WEIGHT: 187.8 LBS | OXYGEN SATURATION: 97 % | HEART RATE: 76 BPM

## 2022-02-21 DIAGNOSIS — C79.51 BONE METASTASIS: Primary | ICD-10-CM

## 2022-02-21 PROCEDURE — 77373 STRTCTC BDY RAD THER TX DLVR: CPT | Performed by: RADIOLOGY

## 2022-02-21 ASSESSMENT — PAIN SCALES - GENERAL: PAINLEVEL: NO PAIN (0)

## 2022-02-21 NOTE — LETTER
2022         RE: Krystyna Aldridge   [address]  [facility] [address]        Dear Colleague,    Thank you for referring your patient, Krystyna Aldridge, to the [facility] RADIATION ONCOLOGY. Please see a copy of my visit note below.    [facility]  SPECIALIZING IN BREAKTHROUGHS  Radiation Oncology    On Treatment Visit Note      Krystyna Aldridge      Date: 2022   MRN: [id number]   : 1945  Diagnosis: Metastatic breast cancer      Reason for Visit:  On Radiation Treatment Visit     Treatment Summary to Date  Treatment Site: C6 Current Dose: 1800/3000 cGy Fractions: 3/5      Chemotherapy  Chemo concurrent with radx?: No    ED Visit/Hosiptal Admission: None    Treatment Breaks: None      Subjective:   Krystyna is tolerating treatment well. She denies skin irritation, fatigue, throat pain. She has some shoulder discomfort during treatment because of the immobilization S-frame. She wonders if she can continue to do sit-ups (which she does about 200 every morning)    Nursing ROS:   Nutrition Alteration  Diet Type: Patient's Preference  Skin  Skin Reaction: 0 - No changes       Gastrointestinal  Nausea: 0 - None        Pain Assessment  0-10 Pain Scale: 0      Objective:   BP (!) 150/82   Pulse 76   Resp 16   Wt 85.2 kg (187 lb 12.8 oz)   SpO2 97%   BMI 32.06 kg/m    Gen: Appears well, in no acute distress  Skin: No erythema    Labs:  CBC RESULTS: No results for input(s): WBC, RBC, HGB, HCT, MCV, MCH, MCHC, RDW, PLT in the last 79198 hours.  ELECTROLYTES:  No results for input(s): NA, POTASSIUM, CHLORIDE, EDISON, CO2, BUN, CR, GLC in the last 49434 hours.    Assessment:    Tolerating radiation therapy well.  All questions and concerns addressed.    Toxicities:  Pain: Grade 0: No toxicity  Dermatitis: Grade 0: No toxicity   Esophagitis: no toxicity    Plan:   1. Continue current therapy.    2. She can continue with sit ups.  3. Virtual follow up in 1 month with Ms. Christiansen  JERARDO Small.       Mosaiq chart and setup information reviewed  MVCT/IGRT images checked    Medication Review  Med list reviewed with patient?: Yes  Med list printed and given: Offered and declined    Educational Topic Discussed  Additional Instructions: continue xrt  Education Instructions: exercise restrictions      Please do not send letter to referring physician.      15 minutes were spent on the date of the encounter doing chart review, history and exam, documentation and further activities as noted above.           Again, thank you for allowing me to participate in the care of your patient.      Sincerely,    Cezar Arias MD

## 2022-02-21 NOTE — PROGRESS NOTES
[facility]  SPECIALIZING IN BREAKTHROUGHS  Radiation Oncology    On Treatment Visit Note      Krystyna Aldridge      Date: 2022   MRN: [id number]   : 1945  Diagnosis: Metastatic breast cancer      Reason for Visit:  On Radiation Treatment Visit     Treatment Summary to Date  Treatment Site: C6 Current Dose: 1800/3000 cGy Fractions: 3/5      Chemotherapy  Chemo concurrent with radx?: No    ED Visit/Hosiptal Admission: None    Treatment Breaks: None      Subjective:   Krystyna is tolerating treatment well. She denies skin irritation, fatigue, throat pain. She has some shoulder discomfort during treatment because of the immobilization S-frame. She wonders if she can continue to do sit-ups (which she does about 200 every morning)    Nursing ROS:   Nutrition Alteration  Diet Type: Patient's Preference  Skin  Skin Reaction: 0 - No changes       Gastrointestinal  Nausea: 0 - None        Pain Assessment  0-10 Pain Scale: 0      Objective:   BP (!) 150/82   Pulse 76   Resp 16   Wt 85.2 kg (187 lb 12.8 oz)   SpO2 97%   BMI 32.06 kg/m    Gen: Appears well, in no acute distress  Skin: No erythema    Labs:  CBC RESULTS: No results for input(s): WBC, RBC, HGB, HCT, MCV, MCH, MCHC, RDW, PLT in the last 93592 hours.  ELECTROLYTES:  No results for input(s): NA, POTASSIUM, CHLORIDE, EDISON, CO2, BUN, CR, GLC in the last 32042 hours.    Assessment:    Tolerating radiation therapy well.  All questions and concerns addressed.    Toxicities:  Pain: Grade 0: No toxicity  Dermatitis: Grade 0: No toxicity   Esophagitis: no toxicity    Plan:   1. Continue current therapy.    2. She can continue with sit ups.  3. Virtual follow up in 1 month with Ms. Елена Small NP.       Mosaiq chart and setup information reviewed  MVCT/IGRT images checked    Medication Review  Med list reviewed with patient?: Yes  Med list printed and given: Offered and declined    Educational Topic Discussed  Additional Instructions:  continue xrt  Education Instructions: exercise restrictions        Cezar Arias MD/PhD  [phone] clinic  Pager [phone]    Please do not send letter to referring physician.        15 minutes were spent on the date of the encounter doing chart review, history and exam, documentation and further activities as noted above.       Cezar Arias MD

## 2022-02-22 ENCOUNTER — APPOINTMENT (OUTPATIENT)
Dept: RADIATION ONCOLOGY | Facility: CLINIC | Age: 77
End: 2022-02-22
Attending: RADIOLOGY
Payer: MEDICARE

## 2022-02-22 ENCOUNTER — ONCOLOGY VISIT (OUTPATIENT)
Dept: RADIATION ONCOLOGY | Facility: CLINIC | Age: 77
End: 2022-02-22
Payer: COMMERCIAL

## 2022-02-22 PROCEDURE — 77373 STRTCTC BDY RAD THER TX DLVR: CPT | Performed by: RADIOLOGY

## 2022-02-22 NOTE — PROCEDURES
DEANNA BARNES  MR#: [id number]        STEREOTACTIC BODY RADIOTHERAPY TREATMENT (SBRT) NOTE        DATE OF SERVICE: 2/17/2022          Diagnosis: C79.51  Secondary malignant neoplasm of bone     Medical Indication for SBRT: The patient has metastatic disease to the spine which   cannot be treated with conventional radiotherapy techniques due to the proximity of the   tumor to the spinal cord and nerve roots.     Intent of SBRT: Palliative     Narrative:  This is a treatment note for the 1st of 5 fractions of SBRT for treatment of metastatic   disease involving the spine.     The patient was positioned and immobilized using an S-Frame with head & shoulder   aquaplast mask. The patient had a cone beam CT scan today prior to treatment to verify   the isocenter location.       Following verification of the treatment setup, the patient had radiotherapy delivered using   1 non-coplanar arcs. A total of 600 cGy was delivered to PTV using 6 MV photons with   heterogeneity corrections. The cumulative dose is  600 cGy of planned 3000 cGy.     No complications were encountered. There were no complaint or skin reactions seen. The   patient tolerated the therapy well and left the department in stable condition and will   return for the 2nd fraction of total 5 on February 18, 2022.       Cezar Arias MD/PhD  Department of Radiation Oncology

## 2022-02-22 NOTE — PROCEDURES
DEANNA BARNES  MR#: [id number]        STEREOTACTIC BODY RADIOTHERAPY TREATMENT (SBRT) NOTE        DATE OF SERVICE: 2/21/2022          Diagnosis: C79.51  Secondary malignant neoplasm of bone     Medical Indication for SBRT: The patient has metastatic disease to the spine which   cannot be treated with conventional radiotherapy techniques due to the proximity of the   tumor to the spinal cord and nerve roots.     Intent of SBRT: Palliative     Narrative:  This is a treatment note for the 3rd of 5 fractions of SBRT for treatment of metastatic   disease involving the spine.     The patient was positioned and immobilized using an S-Frame and aquaplast mask. The   patient had a cone beam CT scan today prior to treatment to verify the isocenter location.       Following verification of the treatment setup, the patient had radiotherapy delivered using   1 non-coplanar arcs. A total of 600cGy was delivered to PTV using 6 MV photons with   heterogeneity corrections. The cumulative dose is  1800cGy of planned 3000 cGy.     No complications were encountered. There were no complaint or skin reactions seen. The   patient tolerated the therapy well and left the department in stable condition and will   return for the 4th fraction of total 5 on February 22, 2022.       Cezar Arias MD/PhD  Department of Radiation Oncology

## 2022-02-22 NOTE — PROCEDURES
DEANNA BARNES  MR#: [id number]        STEREOTACTIC BODY RADIOTHERAPY TREATMENT (SBRT) NOTE        DATE OF SERVICE: 2/18/2022          Diagnosis: C79.51  Secondary malignant neoplasm of bone     Medical Indication for SBRT: The patient has metastatic disease to the spine which   cannot be treated with conventional radiotherapy techniques due to the proximity of the   tumor to the spinal cord and nerve roots.     Intent of SBRT: Palliative     Narrative:  This is a treatment note for the 2nd of 5 fractions of SBRT for treatment of metastatic   disease involving the spine.     The patient was positioned and immobilized using an S-Frame and aquaplast mask. The   patient had a cone beam CT scan today prior to treatment to verify the isocenter location.       Following verification of the treatment setup, the patient had radiotherapy delivered using   1 non-coplanar arcs. A total of 600cGy was delivered to PTV using 6 MV photons with   heterogeneity corrections. The cumulative dose is  1200cGy of planned 3000 cGy.     No complications were encountered. There were no complaint or skin reactions seen. The   patient tolerated the therapy well and left the department in stable condition and will   return for the 3rd fraction of total 5 on February 21, 2022.       Cezar Arias MD/PhD  Department of Radiation Oncology

## 2022-02-23 ENCOUNTER — APPOINTMENT (OUTPATIENT)
Dept: RADIATION ONCOLOGY | Facility: CLINIC | Age: 77
End: 2022-02-23
Attending: RADIOLOGY
Payer: MEDICARE

## 2022-02-23 PROCEDURE — 77435 SBRT MANAGEMENT: CPT | Performed by: RADIOLOGY

## 2022-02-23 PROCEDURE — 77336 RADIATION PHYSICS CONSULT: CPT | Performed by: RADIOLOGY

## 2022-02-23 PROCEDURE — 77373 STRTCTC BDY RAD THER TX DLVR: CPT | Performed by: RADIOLOGY

## 2022-02-23 NOTE — PROCEDURES
DEANNA BARNES  MR#: [id number]        STEREOTACTIC BODY RADIOTHERAPY TREATMENT (SBRT) NOTE        DATE OF SERVICE: 2/22/2022          Diagnosis: C79.51  Secondary malignant neoplasm of bone     Medical Indication for SBRT: The patient has metastatic disease to the spine which   cannot be treated with conventional radiotherapy techniques due to the proximity of the   tumor to the spinal cord and nerve roots.     Intent of SBRT: Palliative     Narrative:  This is a treatment note for the 4th of 5 fractions of SBRT for treatment of metastatic   disease involving the spine.     The patient was positioned and immobilized using an S-Frame and aquaplast mask. The   patient had a cone beam CT scan today prior to treatment to verify the isocenter location.       Following verification of the treatment setup, the patient had radiotherapy delivered using   1 non-coplanar arcs. A total of 600cGy was delivered to PTV using 6 MV photons with   heterogeneity corrections. The cumulative dose is  2400cGy of planned 3000 cGy.     No complications were encountered. There were no complaint or skin reactions seen. The   patient tolerated the therapy well and left the department in stable condition and will   return for the 5th fraction of total 5 on February 23, 2022.         Ella Mercado MD  Department of Radiation Oncology

## 2022-02-24 ENCOUNTER — ONCOLOGY VISIT (OUTPATIENT)
Dept: RADIATION ONCOLOGY | Facility: CLINIC | Age: 77
End: 2022-02-24
Payer: COMMERCIAL

## 2022-03-02 ENCOUNTER — TELEPHONE (OUTPATIENT)
Dept: ONCOLOGY | Facility: CLINIC | Age: 77
End: 2022-03-02
Payer: COMMERCIAL

## 2022-03-02 DIAGNOSIS — C79.51 BONE METASTASIS: Primary | ICD-10-CM

## 2022-03-02 RX ORDER — DIPHENHYDRAMINE HYDROCHLORIDE AND LIDOCAINE HYDROCHLORIDE AND ALUMINUM HYDROXIDE AND MAGNESIUM HYDRO
5-10 KIT EVERY 6 HOURS PRN
Qty: 237 ML | Refills: 1 | Status: SHIPPED | OUTPATIENT
Start: 2022-03-02

## 2022-03-02 RX ORDER — OXYCODONE HYDROCHLORIDE 5 MG/1
5 TABLET ORAL EVERY 6 HOURS PRN
Qty: 20 TABLET | Refills: 0 | Status: SHIPPED | OUTPATIENT
Start: 2022-03-02 | End: 2022-03-05

## 2022-03-02 NOTE — TELEPHONE ENCOUNTER
Nurse Triage SBAR    Situation: Terrible Sore Throat    Background:  DX: Breast ca w/bone mets    Assessment: Pt calling to report terrible sore throat; last treatment was 2/23. Sore throat started on Monday; getting worse. Last night hardly slept.  What tried: tylenol-not taking regularly  Able to swallow liquid. Trying warm water, ice chips during the night. Feels good to take these liquids but doesn't make sore throat better  Pain: 10/10  Feels like has something is stuck in her throat and when she  Swallows it  burns.    No sores in mouth.    Recommendation:   Spoke with [facility] Onc RN--This is related to radiation. Dr. Arias and their team will follow up with the patient.    Message routed to Dr. Arias and GINGER Bess

## 2022-03-03 ENCOUNTER — TELEPHONE (OUTPATIENT)
Dept: RADIATION ONCOLOGY | Facility: CLINIC | Age: 77
End: 2022-03-03
Payer: COMMERCIAL

## 2022-03-03 NOTE — TELEPHONE ENCOUNTER
RN called pt to follow up on her pain. Pt states she was able to  the Oxycodone but not the magic mouthwash. She feels she has pain control and is able to eat with the Oxycodone on board. Pt denies need for the magic mouthwash at this time. All questions were answered and pt will call if needed. [phone]

## 2022-03-09 DIAGNOSIS — C79.51 BONE METASTASIS: Primary | ICD-10-CM

## 2022-03-09 DIAGNOSIS — G89.3 CANCER RELATED PAIN: ICD-10-CM

## 2022-03-09 RX ORDER — DIPHENHYDRAMINE HCL 12.5MG/5ML
LIQUID (ML) ORAL
Qty: 10 ML | Refills: 4 | Status: SHIPPED | OUTPATIENT
Start: 2022-03-09

## 2022-03-09 RX ORDER — LIDOCAINE HYDROCHLORIDE 20 MG/ML
SOLUTION OROPHARYNGEAL
Qty: 10 ML | Refills: 4 | Status: SHIPPED | OUTPATIENT
Start: 2022-03-09

## 2022-03-25 ENCOUNTER — ONCOLOGY VISIT (OUTPATIENT)
Dept: RADIATION ONCOLOGY | Facility: CLINIC | Age: 77
End: 2022-03-25
Payer: COMMERCIAL

## 2022-03-26 NOTE — PROCEDURES
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     Intent of SBRT: Palliative     Narrative:  This is a treatment note for the 5th of 5 fractions of SBRT for treatment of metastatic   disease involving the spine.     The patient was positioned and immobilized using an S-Frame and aquaplast mask. The   patient had a cone beam CT scan today prior to treatment to verify the isocenter location.       Following verification of the treatment setup, the patient had radiotherapy delivered using   1 non-coplanar arcs. A total of 600cGy was delivered to PTV using 6 MV photons with   heterogeneity corrections. The cumulative dose is 3000cGy of planned 3000 cGy.     No complications were encountered. There were no complaint or skin reactions seen. The   patient tolerated the therapy well and left the department in stable condition and will   return for followup in approximately 3 months.       Cezar Arias MD/PhD  Department of Radiation Oncology

## 2022-03-29 NOTE — PROCEDURES
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     Treatment Summary:  Radiation Oncology - Course: 2 Protocol:   Treatment Site Current Dose Modality From To Elapsed Days Fx.  2 C6  3,000 cGy 06 X  2/17/2022  2022   6  5          Dose per Fraction: 600 cGy       Total Dose:  3000 cGy            COMMENTS:   Ms. Barnes is a 76 year old woman from [place], [place] with a metastasis in the C6 vertebral   body. She was initially diagnosed with a right sided ER+ breast cancer in  which was treated at that time with   a right mastectomy and adjuvant chemotherapy. She did not receive adjuvant hormonal therapy. She did well until 2012 when she developed chest pain radiating to the right arm. Work up noted multiple small pulmonary nodules, a   pleural thickening of the left lung base, a lesion in the left lateral 7th rib and left T12 pedicle,  as well as additional   disease in the right axilla, right anterior chest wall.  Biopsy of the rib demonstrated recurrent breast cancer, ER+/ND   weakly positive/HER2-. She was started on Fulvestrant / Xgeva on 12, and imaging demonstrated response to this   therapy.      PET/CT on 2017 demonstrated several new areas of bony involvement which was confirmed by MRI. She was   asymptomatic at this time. Anastrozole was added on 3/1/2017. Ribociclib was added in 2018 due to progression in   bone metastases, but changed to Palbociclib due to elevated LFTs.      PET/CT on 21  demonstrated uptake in the right mandible, confirmed on X-Ray. 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 PET/CT on 12/10/21 again showed multifocal hypermetabolic osseus   disease, mostly stable to slightly decreased, except for C6 vertebra, which showed slightly increased uptake with max   SUV from 3.3 to 3.6. She was further evaluated with a cervical spine MRI on 1/7/22 which demonstrated no   compression fracture, but confirmed a marrow replacing process with enhancement involving the C6 vertebral body   along with an additional focus of osseous metastatic disease in the left C6 inferior articular facet. 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     She recently had a second opinion with Dr. Ferrera at [facility] who recommended treatment of the   oligoprogressive C6 lesion, and continuation with Palbocicilib, Fulvestrant and Anastrozole. She was asymptomatic of the   lesion, and given her otherwise well-controlled bony sites, it was reasonable to consider SBRT for local control.      She tolerated treatment well without interruptions or treatment breaks. She noted mild shoulder discomfort from the S-  frame immobilization, and did sit ups for this. She will follow-up in 1 month via phone with Radiation Oncology NP,   Елена Small.                        ED visits/hospitalizations: No     Missed treatments: None     Acute Toxicity Profile by CTC v5.0: none     PAIN MANAGEMENT:  Not applicable                            FOLLOW UP PLAN: Follow up on 3/31/22 by phone with Елена Small NP                          Resident Physician: Ann Mena M.D.   Staff Physician: Cezar Arias M.D. PhD  Physicist: Jacobo Mi PhD     CC: Majo Ferrera MD                                     Radiation Oncology:  [facility] [address] [place] [address]

## 2022-03-31 ENCOUNTER — VIRTUAL VISIT (OUTPATIENT)
Dept: RADIATION ONCOLOGY | Facility: CLINIC | Age: 77
End: 2022-03-31
Attending: NURSE PRACTITIONER
Payer: MEDICARE

## 2022-03-31 DIAGNOSIS — C79.51 BONE METASTASIS: Primary | ICD-10-CM

## 2022-03-31 NOTE — LETTER
3/31/2022         RE: Krystyna Aldridge   [address]  [facility] [address]        Dear Colleague,    Thank you for referring your patient, Krystyna Aldridge, to the [facility] RADIATION ONCOLOGY. Please see a copy of my visit note below.    Radiation Oncology Follow-up phone visit  2022    Krystyna Aldridge  MRN: [id number]   : 1945     DISEASE TREATED:   Metastatic breast cancer with a C6 bone mets    RADIATION THERAPY DELIVERED:   3000 cGy SBRT to C6 completed 2022    INTERVAL SINCE COMPLETION OF RADIATION THERAPY:   1 month    SUBJECTIVE:   Krystyna Aldridge is a 77 year old female who is here today for routine 1 month follow up after completing radiation therapy.  She states she had a terrible sore throat for about 16 days but that has since resolved.  No issues swallowing.  She denies any pain in her spine.  Did not notice any fatigue from treatment.  She is otherwise doing well with no concerns or complaints.  She is following up with Dr. Mckenna in [place] and has an appointment scheduled next week and a PET scan 2022.    ROS:  Complete review of systems is negative except for symptoms discussed in subjective above.    Current Outpatient Medications   Medication     acetaminophen (TYLENOL) 325 MG tablet     anastrozole (ARIMIDEX) 1 MG tablet     Calcium-Vitamin D-Vitamin K 500-1000-40 MG-UNT-MCG CHEW     diphenhydrAMINE (BENADRYL) 12.5 MG/5ML solution     esomeprazole (NEXIUM) 20 MG DR capsule     fish oil-omega-3 fatty acids 1000 MG capsule     fulvestrant (FASLODEX) 250 MG/5ML injection     Ginkgo Biloba 100 MG CAPS     glucosamine-chondroitin 500-400 MG CAPS per capsule     lidocaine, viscous, (XYLOCAINE) 2 % solution     magic mouthwash suspension, diphenhydrAMINE, lidocaine, aluminum-magnesium & simethicone, (FIRST-MOUTHWASH BLM) compounding kit     Multiple Vitamins-Minerals (PRESERVISION AREDS) CAPS     palbociclib (IBRANCE) 75 MG tablet     Red Yeast Rice Extract  600 MG CAPS     vitamin C (ASCORBIC ACID) 500 MG tablet     No current facility-administered medications for this visit.        No Known Allergies    Past Medical History:   Diagnosis Date     Metastatic breast cancer (H)          PHYSICAL EXAM:  There were no vitals taken for this visit.  Gen: Alert, in NAD    Psychiatric: Appropriate mood and affect      LABS AND IMAGING:  Reviewed.    IMPRESSION:   Ms. Aldridge is a 77 year old female with metastatic breast cancer with C6 metastasis status post SBRT 1 month ago and doing well    PLAN:   Patient has recovered nicely from acute side effects of radiation therapy.  Patient has no continuing side effects related to radiation.  Discussed long-term side effects from this treatment.  She will continue to follow-up with Dr. Adam in [place].  She will follow up here as needed.      Елена Small, NP  Radiation Oncology  [facility]

## 2022-03-31 NOTE — PROGRESS NOTES
Radiation Oncology Follow-up phone visit  2022    Krystyna Aldridge  MRN: [id number]   : 1945     DISEASE TREATED:   Metastatic breast cancer with a C6 bone mets    RADIATION THERAPY DELIVERED:   3000 cGy SBRT to C6 completed 2022    INTERVAL SINCE COMPLETION OF RADIATION THERAPY:   1 month    SUBJECTIVE:   Krystyna Aldridge is a 77 year old female who is here today for routine 1 month follow up after completing radiation therapy.  She states she had a terrible sore throat for about 16 days but that has since resolved.  No issues swallowing.  She denies any pain in her spine.  Did not notice any fatigue from treatment.  She is otherwise doing well with no concerns or complaints.  She is following up with Dr. Mckenna in [place] and has an appointment scheduled next week and a PET scan 2022.    ROS:  Complete review of systems is negative except for symptoms discussed in subjective above.    Current Outpatient Medications   Medication     acetaminophen (TYLENOL) 325 MG tablet     anastrozole (ARIMIDEX) 1 MG tablet     Calcium-Vitamin D-Vitamin K 500-1000-40 MG-UNT-MCG CHEW     diphenhydrAMINE (BENADRYL) 12.5 MG/5ML solution     esomeprazole (NEXIUM) 20 MG DR capsule     fish oil-omega-3 fatty acids 1000 MG capsule     fulvestrant (FASLODEX) 250 MG/5ML injection     Ginkgo Biloba 100 MG CAPS     glucosamine-chondroitin 500-400 MG CAPS per capsule     lidocaine, viscous, (XYLOCAINE) 2 % solution     magic mouthwash suspension, diphenhydrAMINE, lidocaine, aluminum-magnesium & simethicone, (FIRST-MOUTHWASH BLM) compounding kit     Multiple Vitamins-Minerals (PRESERVISION AREDS) CAPS     palbociclib (IBRANCE) 75 MG tablet     Red Yeast Rice Extract 600 MG CAPS     vitamin C (ASCORBIC ACID) 500 MG tablet     No current facility-administered medications for this visit.        No Known Allergies    Past Medical History:   Diagnosis Date     Metastatic breast cancer (H)          PHYSICAL EXAM:  There  were no vitals taken for this visit.  Gen: Alert, in NAD    Psychiatric: Appropriate mood and affect      LABS AND IMAGING:  Reviewed.    IMPRESSION:   Ms. Aldridge is a 77 year old female with metastatic breast cancer with C6 metastasis status post SBRT 1 month ago and doing well    PLAN:   Patient has recovered nicely from acute side effects of radiation therapy.  Patient has no continuing side effects related to radiation.  Discussed long-term side effects from this treatment.  She will continue to follow-up with Dr. Adam in [place].  She will follow up here as needed.      Елена Small, NP  Radiation Oncology  [facility]